# Patient Record
Sex: FEMALE | Race: BLACK OR AFRICAN AMERICAN | NOT HISPANIC OR LATINO | Employment: FULL TIME | URBAN - METROPOLITAN AREA
[De-identification: names, ages, dates, MRNs, and addresses within clinical notes are randomized per-mention and may not be internally consistent; named-entity substitution may affect disease eponyms.]

---

## 2019-02-08 ENCOUNTER — CONSULT (OUTPATIENT)
Dept: ONCOLOGY | Facility: CLINIC | Age: 39
End: 2019-02-08

## 2019-02-08 ENCOUNTER — APPOINTMENT (OUTPATIENT)
Dept: LAB | Facility: HOSPITAL | Age: 39
End: 2019-02-08

## 2019-02-08 VITALS
HEIGHT: 65 IN | OXYGEN SATURATION: 98 % | SYSTOLIC BLOOD PRESSURE: 123 MMHG | TEMPERATURE: 98.6 F | WEIGHT: 190.7 LBS | DIASTOLIC BLOOD PRESSURE: 88 MMHG | RESPIRATION RATE: 16 BRPM | HEART RATE: 84 BPM | BODY MASS INDEX: 31.77 KG/M2

## 2019-02-08 DIAGNOSIS — R77.9 ELEVATED SERUM PROTEIN LEVEL: Primary | ICD-10-CM

## 2019-02-08 DIAGNOSIS — D50.0 IRON DEFICIENCY ANEMIA DUE TO CHRONIC BLOOD LOSS: Primary | ICD-10-CM

## 2019-02-08 DIAGNOSIS — D47.2 MONOCLONAL GAMMOPATHY: ICD-10-CM

## 2019-02-08 DIAGNOSIS — R23.3 EASY BRUISING: ICD-10-CM

## 2019-02-08 LAB
APTT PPP: 29.8 SECONDS (ref 22.7–35.4)
B2 MICROGLOB SERPL-MCNC: 1.5 MG/L (ref 0.8–2.2)
BASOPHILS # BLD AUTO: 0.05 10*3/MM3 (ref 0–0.1)
BASOPHILS NFR BLD AUTO: 0.7 % (ref 0–1.1)
CLOSURE TME COLL+ADP BLD: 58 SECONDS (ref 52–122)
CLOSURE TME COLL+EPINEP BLD: 97 SECONDS (ref 68–148)
DEPRECATED RDW RBC AUTO: 48.7 FL (ref 37–49)
EOSINOPHIL # BLD AUTO: 0.15 10*3/MM3 (ref 0–0.36)
EOSINOPHIL NFR BLD AUTO: 2.1 % (ref 1–5)
ERYTHROCYTE [DISTWIDTH] IN BLOOD BY AUTOMATED COUNT: 16.4 % (ref 11.7–14.5)
FERRITIN SERPL-MCNC: 7.2 NG/ML (ref 11–207)
HCT VFR BLD AUTO: 31 % (ref 34–45)
HGB BLD-MCNC: 10.2 G/DL (ref 11.5–14.9)
IMM GRANULOCYTES # BLD AUTO: 0.02 10*3/MM3 (ref 0–0.03)
IMM GRANULOCYTES NFR BLD AUTO: 0.3 % (ref 0–0.5)
INR PPP: 1.01 (ref 0.9–1.1)
IRON 24H UR-MRATE: 28 MCG/DL (ref 37–145)
IRON SATN MFR SERPL: 5 % (ref 14–48)
LDH SERPL-CCNC: 165 U/L (ref 99–259)
LYMPHOCYTES # BLD AUTO: 1.86 10*3/MM3 (ref 1–3.5)
LYMPHOCYTES NFR BLD AUTO: 26 % (ref 20–49)
MCH RBC QN AUTO: 26.9 PG (ref 27–33)
MCHC RBC AUTO-ENTMCNC: 32.9 G/DL (ref 32–35)
MCV RBC AUTO: 81.8 FL (ref 83–97)
MONOCYTES # BLD AUTO: 0.91 10*3/MM3 (ref 0.25–0.8)
MONOCYTES NFR BLD AUTO: 12.7 % (ref 4–12)
NEUTROPHILS # BLD AUTO: 4.16 10*3/MM3 (ref 1.5–7)
NEUTROPHILS NFR BLD AUTO: 58.2 % (ref 39–75)
NRBC BLD AUTO-RTO: 0 /100 WBC (ref 0–0)
PLATELET # BLD AUTO: 267 10*3/MM3 (ref 150–375)
PMV BLD AUTO: 10.8 FL (ref 8.9–12.1)
PROTHROMBIN TIME: 13.1 SECONDS (ref 11.7–14.2)
RBC # BLD AUTO: 3.79 10*6/MM3 (ref 3.9–5)
TIBC SERPL-MCNC: 510 MCG/DL (ref 249–505)
TRANSFERRIN SERPL-MCNC: 364 MG/DL (ref 200–360)
WBC NRBC COR # BLD: 7.15 10*3/MM3 (ref 4–10)

## 2019-02-08 PROCEDURE — 82728 ASSAY OF FERRITIN: CPT | Performed by: INTERNAL MEDICINE

## 2019-02-08 PROCEDURE — 36415 COLL VENOUS BLD VENIPUNCTURE: CPT | Performed by: INTERNAL MEDICINE

## 2019-02-08 PROCEDURE — 85610 PROTHROMBIN TIME: CPT | Performed by: INTERNAL MEDICINE

## 2019-02-08 PROCEDURE — 83540 ASSAY OF IRON: CPT | Performed by: INTERNAL MEDICINE

## 2019-02-08 PROCEDURE — 82232 ASSAY OF BETA-2 PROTEIN: CPT | Performed by: INTERNAL MEDICINE

## 2019-02-08 PROCEDURE — 85576 BLOOD PLATELET AGGREGATION: CPT | Performed by: INTERNAL MEDICINE

## 2019-02-08 PROCEDURE — 85730 THROMBOPLASTIN TIME PARTIAL: CPT | Performed by: INTERNAL MEDICINE

## 2019-02-08 PROCEDURE — 84466 ASSAY OF TRANSFERRIN: CPT | Performed by: INTERNAL MEDICINE

## 2019-02-08 PROCEDURE — 83615 LACTATE (LD) (LDH) ENZYME: CPT | Performed by: INTERNAL MEDICINE

## 2019-02-08 PROCEDURE — 85025 COMPLETE CBC W/AUTO DIFF WBC: CPT | Performed by: INTERNAL MEDICINE

## 2019-02-08 PROCEDURE — 99244 OFF/OP CNSLTJ NEW/EST MOD 40: CPT | Performed by: INTERNAL MEDICINE

## 2019-02-08 NOTE — PROGRESS NOTES
Subjective     REASON FOR CONSULTATION:  Provide an opinion on any further workup or treatment on:    Elevated globulin level                       REQUESTING PHYSICIAN: Maryann Arrington,*    RECORDS OBTAINED: Records of the patients history including those obtained from the referring provider were reviewed and summarized in detail.    HISTORY OF PRESENT ILLNESS:    Roverto Miller is a 38 y.o. patient who was referred for evaluation of elevated globulin level.     Patient had labs on 1/3/2019 and was noted to have elevated total protein level at 8.6.  Globulin was 4.4.  On review of old labs, she has a protein level of 7.8 and globulin of 3.9 on 11/28/2016.  The patient states that she did not have any recent infections.  She reports some discomfort in the lower back.  Develops after sitting for a long period of time and it improves with increased activity.  No persistent pain.  This has been present for > 2 years.  She is not having problem with inflammation and swelling of the fingers.      Patient reports spotting over the past month.  He developed after her menstrual cycle finished.  She is going to see her gynecologist later this month.  She reports easy bruising.  No problem with bleeding with her previous surgeries.     REVIEW OF SYSTEMS:  Review of Systems   Constitutional: Negative for chills, fever and unexpected weight change.   HENT: Negative for mouth sores, nosebleeds, sore throat and voice change.    Eyes: Negative for visual disturbance.   Respiratory: Negative for cough and shortness of breath.    Cardiovascular: Negative for chest pain and leg swelling.   Gastrointestinal: Negative for abdominal pain, blood in stool, constipation, diarrhea, nausea and vomiting.   Genitourinary: Positive for hematuria. Negative for dysuria and frequency.   Musculoskeletal: Positive for back pain. Negative for arthralgias and joint swelling.   Skin: Negative for rash.   Neurological: Negative for dizziness,  numbness and headaches.   Hematological: Negative for adenopathy. Does not bruise/bleed easily.   Psychiatric/Behavioral: Negative for dysphoric mood. The patient is not nervous/anxious.        History reviewed. No pertinent past medical history.    Past Surgical History:   Procedure Laterality Date   • TUBAL ABDOMINAL LIGATION  2004       Social History     Socioeconomic History   • Marital status:      Spouse name: Not on file   • Number of children: 3   • Years of education: Not on file   • Highest education level: Not on file   Social Needs   • Financial resource strain: Not on file   • Food insecurity - worry: Not on file   • Food insecurity - inability: Not on file   • Transportation needs - medical: Not on file   • Transportation needs - non-medical: Not on file   Occupational History   • Occupation: Nurse     Employer: [employer]   Tobacco Use   • Smoking status: Never Smoker   • Smokeless tobacco: Never Used   Substance and Sexual Activity   • Alcohol use: Yes     Comment: RARELY, 3-4 drinks per month   • Drug use: No   • Sexual activity: Defer   Other Topics Concern   • Not on file   Social History Narrative   • Not on file       Cancer-related family history is negative for Colon cancer.    MEDICATIONS:    Current Outpatient Medications:   •  norgestimate-ethinyl estradiol (SPRINTEC 28) 0.25-35 MG-MCG per tablet, Take 1 package by mouth Daily., Disp: , Rfl:   •  albuterol (PROVENTIL HFA;VENTOLIN HFA) 108 (90 BASE) MCG/ACT inhaler, Inhale 2 puffs Every 4 (Four) Hours As Needed for wheezing., Disp: 1 inhaler, Rfl: 0  •  albuterol (PROVENTIL HFA;VENTOLIN HFA) 108 (90 Base) MCG/ACT inhaler, Inhale 2 puffs Every 4 (Four) Hours As Needed for Wheezing., Disp: 1 inhaler, Rfl: 0  •  amoxicillin-clavulanate (AUGMENTIN) 875-125 MG per tablet, Take 1 tablet by mouth 2 (Two) Times a Day., Disp: 28 tablet, Rfl: 0  •  guaiFENesin (MUCINEX) 600 MG 12 hr tablet, Take 2 tablets by mouth 2 (Two) Times  "a Day., Disp: 30 tablet, Rfl: 0  •  guaifenesin-codeine (GUAIFENESIN AC) 100-10 MG/5ML liquid, Take 5 mL by mouth 3 (Three) Times a Day As Needed for Cough., Disp: 150 mL, Rfl: 0  •  promethazine-dextromethorphan (PROMETHAZINE-DM) 6.25-15 MG/5ML syrup, Take 5 mL by mouth At Night As Needed for cough., Disp: 180 mL, Rfl: 0     ALLERGIES:  No Known Allergies     Objective   VITAL SIGNS:  Vitals:    02/08/19 0833   BP: 123/88   Pulse: 84   Resp: 16   Temp: 98.6 °F (37 °C)   TempSrc: Oral   SpO2: 98%   Weight: 86.5 kg (190 lb 11.2 oz)   Height: 165.1 cm (65\")  Comment: new ht   PainSc: 0-No pain       Wt Readings from Last 3 Encounters:   02/08/19 86.5 kg (190 lb 11.2 oz)   01/25/18 80.7 kg (178 lb)   12/09/16 80.7 kg (178 lb)       PHYSICAL EXAMINATION  GENERAL:  The patient appears in good general condition, not in acute distress.  SKIN: Warm and dry. No skin rashes or petechiae.  One area of ecchymosis over the right forearm.  HEAD:  Normocephalic.  EYES:  No Jaundice. No Pallor.   NECK:  Supple with Good ROM. No Thyromegaly. No Masses.  LYMPHATICS:  No cervical or supraclavicular lymphadenopathy.  CHEST: Normal respiratory effort. Lungs clear to auscultation.   CARDIAC:  Normal S1 & S2. No murmurs. No edema.  ABDOMEN:  Soft. No tenderness. No Hepatomegaly. No Splenomegaly. No masses.  EXTREMITIES:  No clubbing. No joint swelling in the hands. No Calf tenderness.  NEUROLOGICAL:  No Focal neurological deficits.         RESULT REVIEW:   Results from last 7 days   Lab Units 02/08/19  0823   WBC 10*3/mm3 7.15   NEUTROS ABS 10*3/mm3 4.16   HEMOGLOBIN g/dL 10.2*   HEMATOCRIT % 31.0*   PLATELETS 10*3/mm3 267          Assessment/Plan   1.  Hypergammaglobulinemia.  This was defined as marginal elevation in the globulin level in November 2016.  The globulin increased to 4.4 g on 1/3/2019.  She did not have any recent infection.  No problem with inflammation.  She reports discomfort in the low back but no persistent pain.  The " differential diagnosis is monoclonal gammopathy versus polyclonal increase in the globulin level due to underlying inflammation.    2.  Microcytic hypochromic anemia.  She had low iron stores in the past.  B12 and folate were normal.    3.  Easy bruisability.  Patient reports  having problem with spotting after her menstrual cycle is completed.      PLAN:    1.  I will obtain serum protein electropheresis, immunofixation and serum free light chains.  I'll also obtain beta-2 microglobulin and LDH levels.    2.  Obtain ferritin and iron panel.      3.  I will obtain platelet function analysis, protime and PTT to evaluate the patient's easy bruisability and bleeding.      I'll see the patient in follow-up in one week to review the results.        Nehemias Nelson MD  02/08/19

## 2019-02-11 LAB
ALBUMIN SERPL-MCNC: 3.6 G/DL (ref 2.9–4.4)
ALBUMIN/GLOB SERPL: 0.8 {RATIO} (ref 0.7–1.7)
ALPHA1 GLOB FLD ELPH-MCNC: 0.3 G/DL (ref 0–0.4)
ALPHA2 GLOB SERPL ELPH-MCNC: 0.9 G/DL (ref 0.4–1)
B-GLOBULIN SERPL ELPH-MCNC: 1.3 G/DL (ref 0.7–1.3)
GAMMA GLOB SERPL ELPH-MCNC: 2.4 G/DL (ref 0.4–1.8)
GLOBULIN SER CALC-MCNC: 4.9 G/DL (ref 2.2–3.9)
IGA SERPL-MCNC: 376 MG/DL (ref 87–352)
IGG SERPL-MCNC: 2161 MG/DL (ref 700–1600)
IGM SERPL-MCNC: 193 MG/DL (ref 26–217)
INTERPRETATION SERPL IEP-IMP: ABNORMAL
KAPPA LC SERPL-MCNC: 26.1 MG/L (ref 3.3–19.4)
KAPPA LC/LAMBDA SER: 1.16 {RATIO} (ref 0.26–1.65)
LAMBDA LC FREE SERPL-MCNC: 22.5 MG/L (ref 5.7–26.3)
Lab: ABNORMAL
M-SPIKE: ABNORMAL G/DL
PROT SERPL-MCNC: 8.5 G/DL (ref 6–8.5)

## 2019-02-14 ENCOUNTER — APPOINTMENT (OUTPATIENT)
Dept: ONCOLOGY | Facility: CLINIC | Age: 39
End: 2019-02-14

## 2019-02-15 ENCOUNTER — APPOINTMENT (OUTPATIENT)
Dept: LAB | Facility: HOSPITAL | Age: 39
End: 2019-02-15

## 2019-02-15 ENCOUNTER — OFFICE VISIT (OUTPATIENT)
Dept: ONCOLOGY | Facility: CLINIC | Age: 39
End: 2019-02-15

## 2019-02-15 VITALS
RESPIRATION RATE: 16 BRPM | SYSTOLIC BLOOD PRESSURE: 119 MMHG | TEMPERATURE: 98.3 F | WEIGHT: 191 LBS | HEIGHT: 65 IN | HEART RATE: 94 BPM | DIASTOLIC BLOOD PRESSURE: 81 MMHG | BODY MASS INDEX: 31.82 KG/M2 | OXYGEN SATURATION: 100 %

## 2019-02-15 DIAGNOSIS — R23.3 EASY BRUISING: ICD-10-CM

## 2019-02-15 DIAGNOSIS — D89.0 POLYCLONAL GAMMOPATHY: Primary | ICD-10-CM

## 2019-02-15 DIAGNOSIS — D50.0 IRON DEFICIENCY ANEMIA DUE TO CHRONIC BLOOD LOSS: ICD-10-CM

## 2019-02-15 PROCEDURE — 99214 OFFICE O/P EST MOD 30 MIN: CPT | Performed by: INTERNAL MEDICINE

## 2019-02-15 PROCEDURE — G0463 HOSPITAL OUTPT CLINIC VISIT: HCPCS | Performed by: INTERNAL MEDICINE

## 2019-02-15 NOTE — PROGRESS NOTES
Subjective     CHIEF COMPLAINT:      Chief Complaint   Patient presents with   • Follow-up     no concerns       HISTORY OF PRESENT ILLNESS:     Roverto Miller is a 38 y.o. female patient who returns today for follow up on hypergammaglobulinemia.  She reports having some stiffness of the back especially in the morning.  It gets better as she becomes more active.  She is not having pain or swelling of the fingers hands.    Patient reports fatigue.  She also reports easy bruising.  No problem with bleeding.        REVIEW OF SYSTEMS:  Review of Systems   Constitutional: Negative for chills, fever and unexpected weight change.   HENT: Negative for mouth sores, nosebleeds, sore throat and voice change.    Eyes: Negative for visual disturbance.   Respiratory: Negative for cough and shortness of breath.    Cardiovascular: Negative for chest pain and leg swelling.   Gastrointestinal: Negative for abdominal pain, blood in stool, constipation, diarrhea, nausea and vomiting.   Genitourinary: Negative for dysuria, frequency and hematuria.   Musculoskeletal: Negative for arthralgias, back pain and joint swelling.   Skin: Negative for rash.   Neurological: Negative for dizziness, numbness and headaches.   Hematological: Negative for adenopathy. Does not bruise/bleed easily.   Psychiatric/Behavioral: Negative for dysphoric mood. The patient is not nervous/anxious.      I verified the ROS obtained by the MA.      Past Medical History:   Diagnosis Date   • Anemia     Iron deficiency   • Tattoos        Past Surgical History:   Procedure Laterality Date   • TUBAL ABDOMINAL LIGATION  2004       Cancer-related family history is negative for Colon cancer.  Social History     Tobacco Use   • Smoking status: Never Smoker   • Smokeless tobacco: Never Used   Substance Use Topics   • Alcohol use: Yes     Comment: RARELY, 3-4 drinks per month       MEDICATIONS:    Current Outpatient Medications:   •  norgestimate-ethinyl estradiol (SPRINTEC  "28) 0.25-35 MG-MCG per tablet, Take 1 package by mouth Daily., Disp: , Rfl:   •  albuterol (PROVENTIL HFA;VENTOLIN HFA) 108 (90 BASE) MCG/ACT inhaler, Inhale 2 puffs Every 4 (Four) Hours As Needed for wheezing., Disp: 1 inhaler, Rfl: 0  •  albuterol (PROVENTIL HFA;VENTOLIN HFA) 108 (90 Base) MCG/ACT inhaler, Inhale 2 puffs Every 4 (Four) Hours As Needed for Wheezing., Disp: 1 inhaler, Rfl: 0  •  amoxicillin-clavulanate (AUGMENTIN) 875-125 MG per tablet, Take 1 tablet by mouth 2 (Two) Times a Day., Disp: 28 tablet, Rfl: 0  •  guaifenesin-codeine (GUAIFENESIN AC) 100-10 MG/5ML liquid, Take 5 mL by mouth 3 (Three) Times a Day As Needed for Cough., Disp: 150 mL, Rfl: 0  •  promethazine-dextromethorphan (PROMETHAZINE-DM) 6.25-15 MG/5ML syrup, Take 5 mL by mouth At Night As Needed for cough., Disp: 180 mL, Rfl: 0    ALLERGIES:  No Known Allergies      Objective   VITAL SIGNS:     Vitals:    02/15/19 0905   BP: 119/81   Pulse: 94   Resp: 16   Temp: 98.3 °F (36.8 °C)   TempSrc: Oral   SpO2: 100%   Weight: 86.6 kg (191 lb)   Height: 165.1 cm (65\")   PainSc: 0-No pain     Body mass index is 31.78 kg/m².     Wt Readings from Last 3 Encounters:   02/15/19 86.6 kg (191 lb)   02/08/19 86.5 kg (190 lb 11.2 oz)   01/25/18 80.7 kg (178 lb)       PHYSICAL EXAMINATION:  GENERAL:  The patient appears in good general condition, not in acute distress.  SKIN: Warm and dry. No skin rashes, ecchymosis or petechiae.  HEAD:  Normocephalic.  EYES:  No Jaundice. No Pallor.   CHEST: Normal respiratory effort.   CARDIAC:   No edema.  NEUROLOGICAL:  No Focal neurological deficits.         DIAGNOSTIC DATA:       Component      Latest Ref Rng & Units 2/8/2019   IgG      700 - 1600 mg/dL 2161 (H)   IgA      87 - 352 mg/dL 376 (H)   IgM      26 - 217 mg/dL 193   Total Protein      6.0 - 8.5 g/dL 8.5   Albumin      2.9 - 4.4 g/dL 3.6   Alpha-1-Globulin      0.0 - 0.4 g/dL 0.3   Alpha-2-Globulin      0.4 - 1.0 g/dL 0.9   Beta Globulin      0.7 - 1.3 " g/dL 1.3   Gamma Globulin      0.4 - 1.8 g/dL 2.4 (H)   M-Shoaib      Not Observed g/dL Not Observed   Globulin      2.2 - 3.9 g/dL 4.9 (H)   A/G Ratio      0.7 - 1.7 0.8   Immunofixation Reflex, Serum       Comment   Please Note       Comment   Free Light Chain, Kappa      3.3 - 19.4 mg/L 26.1 (H)   Free Lambda Light Chains      5.7 - 26.3 mg/L 22.5   Kappa/Lambda Ratio      0.26 - 1.65 1.16   LDH      99 - 259 U/L 165   Beta-2 Microglobulin      0.8 - 2.2 mg/L 1.5     Component      Latest Ref Rng & Units 2/8/2019   Protime      11.7 - 14.2 Seconds 13.1   INR      0.90 - 1.10 1.01   Collagen/Epinephrine      68 - 148 Seconds 97   Collagen/ADP      52 - 122 Seconds 58   PTT      22.7 - 35.4 seconds 29.8     Component      Latest Ref Rng & Units 2/8/2019   WBC      4.00 - 10.00 10*3/mm3 7.15   RBC      3.90 - 5.00 10*6/mm3 3.79 (L)   Hemoglobin      11.5 - 14.9 g/dL 10.2 (L)   Hematocrit      34.0 - 45.0 % 31.0 (L)   MCV      83.0 - 97.0 fL 81.8 (L)   MCH      27.0 - 33.0 pg 26.9 (L)   MCHC      32.0 - 35.0 g/dL 32.9   RDW      11.7 - 14.5 % 16.4 (H)   RDW-SD      37.0 - 49.0 fl 48.7   MPV      8.9 - 12.1 fL 10.8   Platelets      150 - 375 10*3/mm3 267   Neutrophil Rel %      39.0 - 75.0 % 58.2   Lymphocyte Rel %      20.0 - 49.0 % 26.0   Monocyte Rel %      4.0 - 12.0 % 12.7 (H)   Eosinophil Rel %      1.0 - 5.0 % 2.1   Basophil Rel %      0.0 - 1.1 % 0.7   Immature Granulocyte Rel %      0.0 - 0.5 % 0.3   Neutrophils Absolute      1.50 - 7.00 10*3/mm3 4.16   Lymphocytes Absolute      1.00 - 3.50 10*3/mm3 1.86   Monocytes Absolute      0.25 - 0.80 10*3/mm3 0.91 (H)   Eosinophils Absolute      0.00 - 0.36 10*3/mm3 0.15   Basophils Absolute      0.00 - 0.10 10*3/mm3 0.05   Immature Grans, Absolute      0.00 - 0.03 10*3/mm3 0.02   nRBC      0.0 - 0.0 /100 WBC 0.0   Iron      37 - 145 mcg/dL 28 (L)   Iron Saturation      14 - 48 % 5 (L)   Transferrin      200 - 360 mg/dL 364 (H)   TIBC      249 - 505 mcg/dL 510 (H)    Ferritin      11.00 - 207.00 ng/mL 7.20 (L)       Assessment/Plan   1.  Hypergammaglobulinemia.  This was defined as marginal elevation in the globulin level in November 2016.  The globulin increased to 4.4 g on 1/3/2019.  We obtained serum protein electrophoresis, immunofixation and serum free light chains.  There is no evidence of monoclonal gammopathy.  I reassured the patient that there is no evidence of underlying plasma cell dyscrasia.  The increase is polyclonal and would suggest an underlying inflammation.  I did not recommend additional workup for this.    2.  Iron deficiency anemia.  This is attributed to menstrual blood losses and decreased iron in the diet.    3.  Easy bruisability.  Patient reports  having problem with spotting after her menstrual cycle is completed.  We obtained pro time PTT and platelet function analysis and the results were normal.  I reassured the patient that there is no evidence of underlying coagulopathy.    PLAN:    1.  I recommended starting ferrous sulfate 325 mg once a day.  I explained the side effects including upset stomach, constipation.      2.  If the patient does not tolerate it, I asked her to reduce it every other day.  If it remains intolerable, explained to her that we would recommend IV iron therapy.    We will see the patient in follow-up in 6 months with repeat CBC ferritin iron panel.      Nehemias Nelson MD  02/15/19